# Patient Record
Sex: FEMALE | HISPANIC OR LATINO | Employment: FULL TIME | URBAN - METROPOLITAN AREA
[De-identification: names, ages, dates, MRNs, and addresses within clinical notes are randomized per-mention and may not be internally consistent; named-entity substitution may affect disease eponyms.]

---

## 2017-08-31 ENCOUNTER — HOSPITAL ENCOUNTER (OUTPATIENT)
Facility: MEDICAL CENTER | Age: 27
End: 2017-09-01
Attending: OBSTETRICS & GYNECOLOGY | Admitting: OBSTETRICS & GYNECOLOGY

## 2017-08-31 PROCEDURE — 86762 RUBELLA ANTIBODY: CPT

## 2017-08-31 PROCEDURE — 86803 HEPATITIS C AB TEST: CPT

## 2017-08-31 PROCEDURE — 86780 TREPONEMA PALLIDUM: CPT

## 2017-08-31 PROCEDURE — 85025 COMPLETE CBC W/AUTO DIFF WBC: CPT

## 2017-08-31 PROCEDURE — 87340 HEPATITIS B SURFACE AG IA: CPT

## 2017-08-31 PROCEDURE — 36415 COLL VENOUS BLD VENIPUNCTURE: CPT

## 2017-08-31 PROCEDURE — 59025 FETAL NON-STRESS TEST: CPT | Performed by: NURSE PRACTITIONER

## 2017-09-01 ENCOUNTER — APPOINTMENT (OUTPATIENT)
Dept: RADIOLOGY | Facility: MEDICAL CENTER | Age: 27
End: 2017-09-01
Attending: NURSE PRACTITIONER

## 2017-09-01 ENCOUNTER — HOSPITAL ENCOUNTER (OUTPATIENT)
Facility: MEDICAL CENTER | Age: 27
End: 2017-09-01
Attending: OBSTETRICS & GYNECOLOGY | Admitting: OBSTETRICS & GYNECOLOGY
Payer: MEDICAID

## 2017-09-01 VITALS — RESPIRATION RATE: 16 BRPM | TEMPERATURE: 97.5 F

## 2017-09-01 LAB
ABO GROUP BLD: NORMAL
AMPHET UR QL SCN: NEGATIVE
BARBITURATES UR QL SCN: NEGATIVE
BASOPHILS # BLD AUTO: 0.1 % (ref 0–1.8)
BASOPHILS # BLD: 0.01 K/UL (ref 0–0.12)
BENZODIAZ UR QL SCN: NEGATIVE
BLD GP AB SCN SERPL QL: NORMAL
BZE UR QL SCN: NEGATIVE
CANNABINOIDS UR QL SCN: NEGATIVE
EOSINOPHIL # BLD AUTO: 0.17 K/UL (ref 0–0.51)
EOSINOPHIL NFR BLD: 2.5 % (ref 0–6.9)
ERYTHROCYTE [DISTWIDTH] IN BLOOD BY AUTOMATED COUNT: 43.3 FL (ref 35.9–50)
HBV SURFACE AG SER QL: NEGATIVE
HCT VFR BLD AUTO: 30.6 % (ref 37–47)
HCV AB SER QL: NEGATIVE
HGB BLD-MCNC: 9.6 G/DL (ref 12–16)
HIV 1+2 AB+HIV1 P24 AG SERPL QL IA: NON REACTIVE
IMM GRANULOCYTES # BLD AUTO: 0.03 K/UL (ref 0–0.11)
IMM GRANULOCYTES NFR BLD AUTO: 0.4 % (ref 0–0.9)
LYMPHOCYTES # BLD AUTO: 2.43 K/UL (ref 1–4.8)
LYMPHOCYTES NFR BLD: 35.9 % (ref 22–41)
MCH RBC QN AUTO: 26.6 PG (ref 27–33)
MCHC RBC AUTO-ENTMCNC: 31.4 G/DL (ref 33.6–35)
MCV RBC AUTO: 84.8 FL (ref 81.4–97.8)
METHADONE UR QL SCN: NEGATIVE
MONOCYTES # BLD AUTO: 0.43 K/UL (ref 0–0.85)
MONOCYTES NFR BLD AUTO: 6.4 % (ref 0–13.4)
NEUTROPHILS # BLD AUTO: 3.69 K/UL (ref 2–7.15)
NEUTROPHILS NFR BLD: 54.7 % (ref 44–72)
NRBC # BLD AUTO: 0 K/UL
NRBC BLD AUTO-RTO: 0 /100 WBC
OPIATES UR QL SCN: NEGATIVE
OXYCODONE UR QL SCN: NEGATIVE
PCP UR QL SCN: NEGATIVE
PLATELET # BLD AUTO: 262 K/UL (ref 164–446)
PMV BLD AUTO: 10.1 FL (ref 9–12.9)
PROPOXYPH UR QL SCN: NEGATIVE
RBC # BLD AUTO: 3.61 M/UL (ref 4.2–5.4)
RH BLD: NORMAL
RUBV AB SER QL: 5.6 IU/ML
TREPONEMA PALLIDUM IGG+IGM AB [PRESENCE] IN SERUM OR PLASMA BY IMMUNOASSAY: NON REACTIVE
WBC # BLD AUTO: 6.8 K/UL (ref 4.8–10.8)

## 2017-09-01 PROCEDURE — 76805 OB US >/= 14 WKS SNGL FETUS: CPT

## 2017-09-01 PROCEDURE — 87389 HIV-1 AG W/HIV-1&-2 AB AG IA: CPT

## 2017-09-01 PROCEDURE — 86901 BLOOD TYPING SEROLOGIC RH(D): CPT

## 2017-09-01 PROCEDURE — 86850 RBC ANTIBODY SCREEN: CPT

## 2017-09-01 PROCEDURE — 86900 BLOOD TYPING SEROLOGIC ABO: CPT

## 2017-09-01 PROCEDURE — 81002 URINALYSIS NONAUTO W/O SCOPE: CPT

## 2017-09-01 PROCEDURE — 80307 DRUG TEST PRSMV CHEM ANLYZR: CPT

## 2017-09-01 NOTE — PROGRESS NOTES
LMP 17 EGA - 31.3     2245 - Pt arrived to labor and delivery for bleeding. Pt placed in room S223.  2250 - External monitors in place X2.VSS. Pt has not had any prenatal care, according to patient LMP 17 making her 31.3 weeks.  Pt reports bright red bleeding like a heavy period since 12pm today. No blood visualized by RN. Pt reports good FM. No complaints of contractions, ROM FOB at bedside. POC discussed with pt and family members, all questions answered.   2258- Laurel, CNM notified, report given, CNM to bedside.   2340- orders received for prenatal lab work and complete OB ultrasound.   2348- Laurel CNM at bedside to assess patient.   0030- US at bedside. Pt off monitors  0140- Laurel CNM updated, US at bedside.   0330- Laurel CNM updated with US results, baby is breech everything else WNL. Orders received to do a cervical check by RN.   0345- SVE FT/thick/posterior. Discharge instructions given with  labor precautions, pt instructed to call [facility] first thing in the morning to make prenatal appointment.   0347- Laurel CNM at bedside, education provided. Pt verbalizes understanding. All questions and concerns were addressed.

## 2017-09-01 NOTE — PROGRESS NOTES
Labor and Delivery Triage check    PATIENT ID:  NAME:  Allison Weston  MRN:               [id number]  YOB: 1990     27 y.o. female  at 106w1d.    Subjective: Pt presents to L&D for vaginal bleeding no pain, no prenatal care. States LMP 17 sure dates MARTHA 10/31/17 31 4/7 weeks today    Objective:    Vitals:    17 2300   Resp: 16   Temp: 36.4 °C (97.5 °F)       Cervix:  FTcm/0%/-3 posterior  Breech   U/S done here, all normal fetal survey   [place]: Uterine Contractions none.   FHRM: Baseline 130, mod variability, Accels +, no decels  medications: none  Pain: none    Assessment: 27 y.o. female    at 106w1d.  High Risk no Prenatal care  Plan:   1. Labor: not in labor  2. IUP: Category 1  tracing, breech presentation.  GBS not done yet  3. Prenatal labs done A+ blood type, no vaginal bleeding here tonight,   4. Urine dip is trace blood only  5. No previa or abruption noted on U/S   6. Pt to make apt at [facility] this week for New OB apt  7.  Drug screen urine  8. Discharge in stable condition,   Return to L&D for   Increased vaginal bleeding  Decreased FM  Strong regular contractions  Vaginal bleeding like a period  Leaking fluid from your vagina either a big gush or continuous leaking

## 2017-09-04 LAB
APPEARANCE UR: ABNORMAL
COLOR UR AUTO: YELLOW
GLUCOSE UR QL STRIP.AUTO: 100 MG/DL
KETONES UR QL STRIP.AUTO: ABNORMAL MG/DL
LEUKOCYTE ESTERASE UR QL STRIP.AUTO: NEGATIVE
NITRITE UR QL STRIP.AUTO: NEGATIVE
PH UR STRIP.AUTO: 7 [PH]
PROT UR QL STRIP: NEGATIVE MG/DL
RBC UR QL AUTO: ABNORMAL
SP GR UR: 1.02

## 2017-09-06 ENCOUNTER — APPOINTMENT (OUTPATIENT)
Dept: OBGYN | Facility: CLINIC | Age: 27
End: 2017-09-06
Payer: MEDICAID

## 2017-09-12 ENCOUNTER — HOSPITAL ENCOUNTER (OUTPATIENT)
Facility: MEDICAL CENTER | Age: 27
End: 2017-09-12
Attending: NURSE PRACTITIONER
Payer: COMMERCIAL

## 2017-09-12 ENCOUNTER — INITIAL PRENATAL (OUTPATIENT)
Dept: OBGYN | Facility: CLINIC | Age: 27
End: 2017-09-12

## 2017-09-12 VITALS
DIASTOLIC BLOOD PRESSURE: 60 MMHG | SYSTOLIC BLOOD PRESSURE: 110 MMHG | HEIGHT: 61 IN | BODY MASS INDEX: 34.36 KG/M2 | WEIGHT: 182 LBS

## 2017-09-12 DIAGNOSIS — O09.899 RUBELLA NON-IMMUNE STATUS, ANTEPARTUM: ICD-10-CM

## 2017-09-12 DIAGNOSIS — Z34.83 ENCOUNTER FOR SUPERVISION OF OTHER NORMAL PREGNANCY IN THIRD TRIMESTER: ICD-10-CM

## 2017-09-12 DIAGNOSIS — Z28.39 RUBELLA NON-IMMUNE STATUS, ANTEPARTUM: ICD-10-CM

## 2017-09-12 DIAGNOSIS — D50.9 IRON DEFICIENCY ANEMIA, UNSPECIFIED IRON DEFICIENCY ANEMIA TYPE: ICD-10-CM

## 2017-09-12 PROCEDURE — 90686 IIV4 VACC NO PRSV 0.5 ML IM: CPT | Performed by: NURSE PRACTITIONER

## 2017-09-12 PROCEDURE — 90471 IMMUNIZATION ADMIN: CPT | Performed by: NURSE PRACTITIONER

## 2017-09-12 PROCEDURE — 90472 IMMUNIZATION ADMIN EACH ADD: CPT | Performed by: NURSE PRACTITIONER

## 2017-09-12 PROCEDURE — 59402 PR NEW OB HIGH RISK: CPT | Performed by: NURSE PRACTITIONER

## 2017-09-12 PROCEDURE — 90715 TDAP VACCINE 7 YRS/> IM: CPT | Performed by: NURSE PRACTITIONER

## 2017-09-12 NOTE — LETTER
"Count Your Baby's Movements  Another step to a healthy delivery    Allison Weston    How Many Weeks Pregnant? 34w 3d    Date to Begin Countin17              How to use this chart    One way for your physician to keep track of your baby's health is by knowing how often the baby moves (or \"kicks\") in your womb.  You can help your physician to do this by using this chart every day.    Every day, you should see how many hours it takes for your baby to move 10 times.  Start in the morning, as soon as you get up.    · First, write down the time your baby moves until you get to 10.  · Check off one box every time your baby moves until you get to 10.  · Write down the time you finished counting in the last column.  · Total how long it took to count up all 10 movements.  · Finally, fill in the box that shows how long this took.  After counting 10 movements, you no longer have to count any more that day.  The next morning, just start counting again as soon as you get up.    What should you call a \"movement\"?  It is hard to say, because it will feel different from one mother to another and from one pregnancy to the next.  The important thing is that you count the movements the same way throughout your pregnancy.  If you have more questions, you should ask your physician.    Count carefully every day!  SAMPLE:  Week 28    How many hours did it take to feel 10 movements?       Start  Time     1     2     3     4     5     6     7     8     9     10   Finish Time   Mon 8:20 ·  ·  ·  ·  ·  ·  ·  ·  ·  ·  11:40                  Sat               Sun                 IMPORTANT: You should contact your physician if it takes more than two hours for you to feel 10 movements.  Each morning, write down the time and start to count the movements of your baby.  Keep track by checking off one box every time you feel one movement.  When you have felt 10 \"kicks\", write down the " time you finished counting in the last column.  Then fill in the   box (over the check madison) for the number of hours it took.  Be sure to read the complete instructions on the previous page.

## 2017-09-12 NOTE — PROGRESS NOTES
Tdap vaccine given. 09/12/20 Right Deltoid. VIS given and screening check list reviewed with pt/      Flu vaccine given. 09/12/2017 Left  Deltoid. VIS given and screening check list reviewed with pt.

## 2017-09-12 NOTE — LETTER
Cystic Fibrosis Carrier Testing  Allison Weston    The following information is about a blood test that can be done to determine if you and/or your partner carry the gene for cystic fibrosis.    WHAT IS CYSTIC FIBROSIS?  · Cystic fibrosis (CF) is an inherited disease that affects more than 25,000 American children and young adults.  · Symptoms of CF vary but include lung congestion, pneumonia, diarrhea and poor growth.  Most people with CF have severe medical problems and some die at a young age.  Others have so few symptoms they are unaware they have CF.  · CF does not affect intelligence.  · Although there is no cure for CF at this time, scientists are making progress in improving treatment and in searching for a cure.  In the past many people with CF  at a very young age.  Today, many are living into their 20’s and 30’s.    IS THERE A CHANCE MY BABY COULD HAVE CYSTIC FIBROSIS?  · You can have a child with CF even if there is no history in your family (see chart below).  · CF testing can help determine if you are a carrier and at risk to have a child with CF.  Note: if both parents are carriers, there is a 1 in 4 (25%) chance with each pregnancy that they will have a child with CF.  · Carriers have one normal CF gene and one altered CF gene.  · People with CF have two altered CF genes.  · Most people have two normal copies of the CF gene.    Approximate risk that a couple with no family history of cystic fibrosis will have a child with cystic fibrosis:    Ethnic background / Risk     couple:  1 in 2,500   couple:  1 in 15,000            couple:  1 in 8,000     American couple:  1 in 32,000     WHAT TESTING IS AVAILABLE?  · There is a blood test that can be done to find out if you or your partner is a carrier.  · It is important to understand that CF carrier testing does not detect all CF carriers.  · If the test shows that you are both CF carriers, you unborn baby  can be tested to find out if the baby has CF.    HOW MUCH DOES IT COST TO HAVE CYSTIC FIBROSIS CARRIER TESTING?  · Cost and insurance coverage for CF carrier testing vary depending upon the laboratory used and your insurance policy.  · The average cost for CF carrier testing is $780.00 per person.  · Your genetic counselor can provide you with more information about cystic fibrosis carrier testing.    _____  Yes, I am interested in discussing carrier testing with a genetic counselor.    _____  No, I am not interested in CF carrier testing or in receiving more information about CF carrier testing.      Client signature: ________________________________________  9/12/2017

## 2017-09-12 NOTE — PROGRESS NOTES
Pt here for New OB today  Phone: [phone]  Pharmacy verified  Pt c/o Kermit Mayers; denies any VB, LOF   Pt was seen at [facility] ER for VB on 8-31-17; VB has resolved  Desires BTL  Desires Flu vaccine and TDap  Declines CF

## 2017-09-12 NOTE — PROGRESS NOTES
S:  Allison Weston is a 27 y.o.  who presents for her new OB exam.  She is 34w3d with and MARTHA of Estimated Date of Delivery: 10/21/17 by ultrasound done on L&D for which she had presented for vaginal bleeding. She discharged home stable and the bleeding did not continue.She has no complaints.  Too late AFP.  declines CF.  Denies VB, LOF, or cramping.  Denies dysuria, vaginal DC. Reports good fetal movement.     Pt is  and lives with FOB.  Pregnancy is desired.      Past Medical History:   Diagnosis Date   • Anemia during pregnancy needs to add iron plus taking prenatal vit 2011     Family History   Problem Relation Age of Onset   • Diabetes Maternal Grandmother    • No Known Problems Mother    • Diabetes Father    • Diabetes Paternal Grandfather      Social History     Social History   • Marital status: Single     Spouse name: N/A   • Number of children: N/A   • Years of education: N/A     Occupational History   • Not on file.     Social History Main Topics   • Smoking status: Never Smoker   • Smokeless tobacco: Never Used   • Alcohol use No   • Drug use: No   • Sexual activity: Yes     Partners: Male      Comment: no birth control     Other Topics Concern   • Not on file     Social History Narrative   • No narrative on file     OB History    Para Term  AB Living   5 4 4     4   SAB TAB Ectopic Molar Multiple Live Births           0 4      # Outcome Date GA Lbr Bladimir/2nd Weight Sex Delivery Anes PTL Lv   5 Current            4 Term 16 40w2d  3.26 kg (7 lb 3 oz) F Vag-Spont  N TREVON      Birth Comments: No complications   3 Term 12 40w0d  3.402 kg (7 lb 8 oz) F Vag-Spont None N TREVON   2 Term 11 40w0d  3.26 kg (7 lb 3 oz) F Vag-Spont None  TREVON      Birth Comments: Denies complications   1 Term 09 40w0d 13:00 3.771 kg (8 lb 5 oz) F Vag-Spont None N TREVON      Birth Comments: no complications,  14mos.           History of Varicella Virus: no  History of  "HSV I or II in self or partner: no  History of Thyroid problems: none    O:    Vitals:    09/12/17 1426   BP: 110/60   Weight: 82.6 kg (182 lb)   Height: 1.549 m (5' 1\")      See Prenatal Physical.    Wet mount: none      A:   1.  IUP @ 34w3d per [facility] ultrasound        2.  S=D        3.  See problem list below        4.  Breech presention       Patient Active Problem List    Diagnosis Date Noted   • Rubella non-immune status 09/12/2017   • Breech presentation 09/12/2017   • Iron deficiency anemia 09/12/2017   • Encounter for supervision of other normal pregnancy 11/30/2015         P:  1.  GC/CT done        2.  Prenatal labs ordered - lab slip given        3.  Discussed PNV, diet, avoidances and adequate water intake        4.  NOB packet given        5.  Return to office in 2 wks        6.  Complete OB US done.         7.  Glucose slip and instructions today. TDAP and flu shot.         8.  Breech tilt exercises.     No orders of the defined types were placed in this encounter.    "

## 2017-09-13 LAB
C TRACH DNA SPEC QL NAA+PROBE: NEGATIVE
N GONORRHOEA DNA SPEC QL NAA+PROBE: NEGATIVE
SPECIMEN SOURCE: NORMAL

## 2017-09-29 ENCOUNTER — HOSPITAL ENCOUNTER (OUTPATIENT)
Dept: LAB | Facility: MEDICAL CENTER | Age: 27
End: 2017-09-29
Attending: NURSE PRACTITIONER
Payer: COMMERCIAL

## 2017-09-29 ENCOUNTER — HOSPITAL ENCOUNTER (OUTPATIENT)
Facility: MEDICAL CENTER | Age: 27
End: 2017-09-29
Attending: NURSE PRACTITIONER
Payer: COMMERCIAL

## 2017-09-29 ENCOUNTER — ROUTINE PRENATAL (OUTPATIENT)
Dept: OBGYN | Facility: CLINIC | Age: 27
End: 2017-09-29

## 2017-09-29 VITALS — WEIGHT: 184 LBS | DIASTOLIC BLOOD PRESSURE: 60 MMHG | BODY MASS INDEX: 34.77 KG/M2 | SYSTOLIC BLOOD PRESSURE: 108 MMHG

## 2017-09-29 DIAGNOSIS — Z34.83 ENCOUNTER FOR SUPERVISION OF OTHER NORMAL PREGNANCY IN THIRD TRIMESTER: ICD-10-CM

## 2017-09-29 DIAGNOSIS — Z34.80 ENCOUNTER FOR SUPERVISION OF OTHER NORMAL PREGNANCY: ICD-10-CM

## 2017-09-29 LAB — GLUCOSE 1H P 50 G GLC PO SERPL-MCNC: 97 MG/DL (ref 70–139)

## 2017-09-29 PROCEDURE — 90040 PR PRENATAL FOLLOW UP: CPT | Performed by: NURSE PRACTITIONER

## 2017-09-29 NOTE — PROGRESS NOTES
SUBJECTIVE:  Pt is a 27 y.o.   at 36w6d  gestation. Presents today for follow-up prenatal care. Reports no issues at this time.  Reports good  fetal movement. Denies cramping/contractions, bleeding or leaking of fluid. Denies dysuria, headaches, N/V, or other issues at this time. Generally feels well today. Pt is sure of her last LMP and they were regular every two months which was her normal.     OBJECTIVE:  - See prenatal vitals flow  Vitals:    17 0903   BP: 108/60   Weight: 83.5 kg (184 lb)   - Baby confirmed vertex by US    - Pertinent Labs: GC/CT neg, PNP WNL except anemia,              ASSESSMENT:   - IUP at 35w4d by LMP c/w 32 week US   - S=D   -   Patient Active Problem List    Diagnosis Date Noted   • Rubella non-immune status 2017   • Breech presentation 2017   • Iron deficiency anemia 2017   • Encounter for supervision of other normal pregnancy 2015         PLAN:  - PT to start iron TID  - Pt encouraged to do GCT  - GBS today collected  - S/sx pregnancy and labor warning signs vs general discomforts discussed  - Fetal movements and kick counts reviewed   - Adequate hydration reinforced  - Nutrition/exercise/vitamin education: continued PNV  - Plans for breastfeeding and formula   - Plans BTL for contraception PP: LARC reviewed  - S/p TDAP   - S/p Flu   - Anticipatory guidance given  - RTC in 1 week  for follow-up prenatal care

## 2017-09-29 NOTE — PROGRESS NOTES
Pt here today for OB follow up  Pt states having lower back pain.   Reports +FM  Good # [phone]  Pharmacy Confirmed.  GBS Today   Pt hasnt started taking iron.

## 2017-09-30 LAB — GP B STREP DNA SPEC QL NAA+PROBE: NEGATIVE

## 2017-10-10 ENCOUNTER — HOSPITAL ENCOUNTER (OUTPATIENT)
Facility: MEDICAL CENTER | Age: 27
End: 2017-10-10
Attending: OBSTETRICS & GYNECOLOGY | Admitting: OBSTETRICS & GYNECOLOGY

## 2017-10-10 PROCEDURE — 59025 FETAL NON-STRESS TEST: CPT | Performed by: OBSTETRICS & GYNECOLOGY

## 2017-10-11 NOTE — PROGRESS NOTES
Came in for ob check.EFM applied and POC discussed.she is due 10-30 which makes her 37.1weeks.she isn't ;leaking fluid or bleeding.SVE 2/50/-2 vertex.she is only mayte occasionally.observed-occasional contractions.baby is moving well.Dr Kim notified and patient discharged home in stable condition.

## 2017-10-25 ENCOUNTER — HOSPITAL ENCOUNTER (INPATIENT)
Facility: MEDICAL CENTER | Age: 27
LOS: 1 days | End: 2017-10-26
Attending: OBSTETRICS & GYNECOLOGY | Admitting: OBSTETRICS & GYNECOLOGY
Payer: MEDICAID

## 2017-10-25 LAB
BASOPHILS # BLD AUTO: 0.4 % (ref 0–1.8)
BASOPHILS # BLD: 0.03 K/UL (ref 0–0.12)
EOSINOPHIL # BLD AUTO: 0.1 K/UL (ref 0–0.51)
EOSINOPHIL NFR BLD: 1.4 % (ref 0–6.9)
ERYTHROCYTE [DISTWIDTH] IN BLOOD BY AUTOMATED COUNT: 48.9 FL (ref 35.9–50)
ERYTHROCYTE [DISTWIDTH] IN BLOOD BY AUTOMATED COUNT: 49.8 FL (ref 35.9–50)
HCT VFR BLD AUTO: 29.3 % (ref 37–47)
HCT VFR BLD AUTO: 31 % (ref 37–47)
HGB BLD-MCNC: 9.3 G/DL (ref 12–16)
HGB BLD-MCNC: 9.9 G/DL (ref 12–16)
HOLDING TUBE BB 8507: NORMAL
IMM GRANULOCYTES # BLD AUTO: 0.02 K/UL (ref 0–0.11)
IMM GRANULOCYTES NFR BLD AUTO: 0.3 % (ref 0–0.9)
LYMPHOCYTES # BLD AUTO: 1.72 K/UL (ref 1–4.8)
LYMPHOCYTES NFR BLD: 24.6 % (ref 22–41)
MCH RBC QN AUTO: 26.8 PG (ref 27–33)
MCH RBC QN AUTO: 27.1 PG (ref 27–33)
MCHC RBC AUTO-ENTMCNC: 31.7 G/DL (ref 33.6–35)
MCHC RBC AUTO-ENTMCNC: 31.9 G/DL (ref 33.6–35)
MCV RBC AUTO: 83.8 FL (ref 81.4–97.8)
MCV RBC AUTO: 85.4 FL (ref 81.4–97.8)
MONOCYTES # BLD AUTO: 0.45 K/UL (ref 0–0.85)
MONOCYTES NFR BLD AUTO: 6.4 % (ref 0–13.4)
NEUTROPHILS # BLD AUTO: 4.66 K/UL (ref 2–7.15)
NEUTROPHILS NFR BLD: 66.9 % (ref 44–72)
NRBC # BLD AUTO: 0 K/UL
NRBC BLD AUTO-RTO: 0 /100 WBC
PLATELET # BLD AUTO: 225 K/UL (ref 164–446)
PLATELET # BLD AUTO: 257 K/UL (ref 164–446)
PMV BLD AUTO: 10.7 FL (ref 9–12.9)
PMV BLD AUTO: 10.7 FL (ref 9–12.9)
RBC # BLD AUTO: 3.43 M/UL (ref 4.2–5.4)
RBC # BLD AUTO: 3.7 M/UL (ref 4.2–5.4)
WBC # BLD AUTO: 7 K/UL (ref 4.8–10.8)
WBC # BLD AUTO: 7.4 K/UL (ref 4.8–10.8)

## 2017-10-25 PROCEDURE — 770002 HCHG ROOM/CARE - OB PRIVATE (112)

## 2017-10-25 PROCEDURE — 700105 HCHG RX REV CODE 258

## 2017-10-25 PROCEDURE — 59409 OBSTETRICAL CARE: CPT

## 2017-10-25 PROCEDURE — 85025 COMPLETE CBC W/AUTO DIFF WBC: CPT

## 2017-10-25 PROCEDURE — A9270 NON-COVERED ITEM OR SERVICE: HCPCS | Performed by: NURSE PRACTITIONER

## 2017-10-25 PROCEDURE — 36415 COLL VENOUS BLD VENIPUNCTURE: CPT

## 2017-10-25 PROCEDURE — 85027 COMPLETE CBC AUTOMATED: CPT

## 2017-10-25 PROCEDURE — 304965 HCHG RECOVERY SERVICES

## 2017-10-25 PROCEDURE — 90471 IMMUNIZATION ADMIN: CPT

## 2017-10-25 PROCEDURE — 700111 HCHG RX REV CODE 636 W/ 250 OVERRIDE (IP)

## 2017-10-25 PROCEDURE — 10907ZC DRAINAGE OF AMNIOTIC FLUID, THERAPEUTIC FROM PRODUCTS OF CONCEPTION, VIA NATURAL OR ARTIFICIAL OPENING: ICD-10-PCS | Performed by: OBSTETRICS & GYNECOLOGY

## 2017-10-25 PROCEDURE — 700102 HCHG RX REV CODE 250 W/ 637 OVERRIDE(OP): Performed by: NURSE PRACTITIONER

## 2017-10-25 PROCEDURE — 3E0234Z INTRODUCTION OF SERUM, TOXOID AND VACCINE INTO MUSCLE, PERCUTANEOUS APPROACH: ICD-10-PCS | Performed by: OBSTETRICS & GYNECOLOGY

## 2017-10-25 PROCEDURE — 700111 HCHG RX REV CODE 636 W/ 250 OVERRIDE (IP): Performed by: NURSE PRACTITIONER

## 2017-10-25 PROCEDURE — 90707 MMR VACCINE SC: CPT

## 2017-10-25 RX ORDER — SODIUM CHLORIDE, SODIUM LACTATE, POTASSIUM CHLORIDE, CALCIUM CHLORIDE 600; 310; 30; 20 MG/100ML; MG/100ML; MG/100ML; MG/100ML
INJECTION, SOLUTION INTRAVENOUS PRN
Status: DISCONTINUED | OUTPATIENT
Start: 2017-10-25 | End: 2017-10-26 | Stop reason: HOSPADM

## 2017-10-25 RX ORDER — ONDANSETRON 2 MG/ML
4 INJECTION INTRAMUSCULAR; INTRAVENOUS EVERY 6 HOURS PRN
Status: DISCONTINUED | OUTPATIENT
Start: 2017-10-25 | End: 2017-10-26 | Stop reason: HOSPADM

## 2017-10-25 RX ORDER — ACETAMINOPHEN 325 MG/1
325 TABLET ORAL EVERY 4 HOURS PRN
Status: DISCONTINUED | OUTPATIENT
Start: 2017-10-25 | End: 2017-10-26 | Stop reason: HOSPADM

## 2017-10-25 RX ORDER — ALUMINA, MAGNESIA, AND SIMETHICONE 2400; 2400; 240 MG/30ML; MG/30ML; MG/30ML
30 SUSPENSION ORAL EVERY 6 HOURS PRN
Status: DISCONTINUED | OUTPATIENT
Start: 2017-10-25 | End: 2017-10-25 | Stop reason: HOSPADM

## 2017-10-25 RX ORDER — DOCUSATE SODIUM 100 MG/1
100 CAPSULE, LIQUID FILLED ORAL 2 TIMES DAILY PRN
Status: DISCONTINUED | OUTPATIENT
Start: 2017-10-25 | End: 2017-10-26 | Stop reason: HOSPADM

## 2017-10-25 RX ORDER — ONDANSETRON 4 MG/1
4 TABLET, ORALLY DISINTEGRATING ORAL EVERY 6 HOURS PRN
Status: DISCONTINUED | OUTPATIENT
Start: 2017-10-25 | End: 2017-10-26 | Stop reason: HOSPADM

## 2017-10-25 RX ORDER — BISACODYL 10 MG
10 SUPPOSITORY, RECTAL RECTAL PRN
Status: DISCONTINUED | OUTPATIENT
Start: 2017-10-25 | End: 2017-10-26 | Stop reason: HOSPADM

## 2017-10-25 RX ORDER — METHYLERGONOVINE MALEATE 0.2 MG/ML
0.2 INJECTION INTRAVENOUS
Status: DISCONTINUED | OUTPATIENT
Start: 2017-10-25 | End: 2017-10-25 | Stop reason: HOSPADM

## 2017-10-25 RX ORDER — SODIUM CHLORIDE, SODIUM LACTATE, POTASSIUM CHLORIDE, CALCIUM CHLORIDE 600; 310; 30; 20 MG/100ML; MG/100ML; MG/100ML; MG/100ML
INJECTION, SOLUTION INTRAVENOUS CONTINUOUS
Status: DISCONTINUED | OUTPATIENT
Start: 2017-10-25 | End: 2017-10-25 | Stop reason: HOSPADM

## 2017-10-25 RX ORDER — MISOPROSTOL 200 UG/1
800 TABLET ORAL
Status: COMPLETED | OUTPATIENT
Start: 2017-10-25 | End: 2017-10-25

## 2017-10-25 RX ORDER — IBUPROFEN 800 MG/1
800 TABLET ORAL EVERY 8 HOURS PRN
Status: DISCONTINUED | OUTPATIENT
Start: 2017-10-25 | End: 2017-10-26 | Stop reason: HOSPADM

## 2017-10-25 RX ORDER — OXYCODONE HYDROCHLORIDE AND ACETAMINOPHEN 5; 325 MG/1; MG/1
2 TABLET ORAL EVERY 4 HOURS PRN
Status: DISCONTINUED | OUTPATIENT
Start: 2017-10-25 | End: 2017-10-26 | Stop reason: HOSPADM

## 2017-10-25 RX ORDER — OXYCODONE HYDROCHLORIDE AND ACETAMINOPHEN 5; 325 MG/1; MG/1
1 TABLET ORAL EVERY 4 HOURS PRN
Status: DISCONTINUED | OUTPATIENT
Start: 2017-10-25 | End: 2017-10-26 | Stop reason: HOSPADM

## 2017-10-25 RX ORDER — DEXTROSE, SODIUM CHLORIDE, SODIUM LACTATE, POTASSIUM CHLORIDE, AND CALCIUM CHLORIDE 5; .6; .31; .03; .02 G/100ML; G/100ML; G/100ML; G/100ML; G/100ML
INJECTION, SOLUTION INTRAVENOUS CONTINUOUS
Status: DISCONTINUED | OUTPATIENT
Start: 2017-10-25 | End: 2017-10-25 | Stop reason: HOSPADM

## 2017-10-25 RX ORDER — SODIUM CHLORIDE, SODIUM LACTATE, POTASSIUM CHLORIDE, CALCIUM CHLORIDE 600; 310; 30; 20 MG/100ML; MG/100ML; MG/100ML; MG/100ML
INJECTION, SOLUTION INTRAVENOUS
Status: COMPLETED
Start: 2017-10-25 | End: 2017-10-25

## 2017-10-25 RX ADMIN — SODIUM CHLORIDE, SODIUM LACTATE, POTASSIUM CHLORIDE, CALCIUM CHLORIDE: 600; 310; 30; 20 INJECTION, SOLUTION INTRAVENOUS at 07:20

## 2017-10-25 RX ADMIN — OXYTOCIN 2000 ML/HR: 10 INJECTION, SOLUTION INTRAMUSCULAR; INTRAVENOUS at 10:00

## 2017-10-25 RX ADMIN — OXYCODONE HYDROCHLORIDE AND ACETAMINOPHEN 1 TABLET: 5; 325 TABLET ORAL at 10:25

## 2017-10-25 RX ADMIN — MEASLES, MUMPS, AND RUBELLA VIRUS VACCINE LIVE 0.5 ML: 1000; 12500; 1000 INJECTION, POWDER, LYOPHILIZED, FOR SUSPENSION SUBCUTANEOUS at 20:28

## 2017-10-25 RX ADMIN — IBUPROFEN 800 MG: 800 TABLET, FILM COATED ORAL at 10:25

## 2017-10-25 RX ADMIN — SODIUM CHLORIDE, POTASSIUM CHLORIDE, SODIUM LACTATE AND CALCIUM CHLORIDE: 600; 310; 30; 20 INJECTION, SOLUTION INTRAVENOUS at 07:20

## 2017-10-25 RX ADMIN — OXYTOCIN 125 ML/HR: 10 INJECTION, SOLUTION INTRAMUSCULAR; INTRAVENOUS at 11:00

## 2017-10-25 RX ADMIN — OXYCODONE HYDROCHLORIDE AND ACETAMINOPHEN 2 TABLET: 5; 325 TABLET ORAL at 16:16

## 2017-10-25 ASSESSMENT — LIFESTYLE VARIABLES
DO YOU DRINK ALCOHOL: NO
ALCOHOL_USE: NO
EVER_SMOKED: NEVER
DO YOU DRINK ALCOHOL: NO

## 2017-10-25 ASSESSMENT — COPD QUESTIONNAIRES
HAVE YOU SMOKED AT LEAST 100 CIGARETTES IN YOUR ENTIRE LIFE: NO/DON'T KNOW
DO YOU EVER COUGH UP ANY MUCUS OR PHLEGM?: NO/ONLY WITH OCCASIONAL COLDS OR INFECTIONS
DURING THE PAST 4 WEEKS HOW MUCH DID YOU FEEL SHORT OF BREATH: NONE/LITTLE OF THE TIME

## 2017-10-25 ASSESSMENT — PAIN SCALES - GENERAL
PAINLEVEL_OUTOF10: 0
PAINLEVEL_OUTOF10: 8
PAINLEVEL_OUTOF10: 10
PAINLEVEL_OUTOF10: 0
PAINLEVEL_OUTOF10: 0
PAINLEVEL_OUTOF10: ASSUMED PAIN PRESENT

## 2017-10-25 ASSESSMENT — PATIENT HEALTH QUESTIONNAIRE - PHQ9
SUM OF ALL RESPONSES TO PHQ QUESTIONS 1-9: 0
1. LITTLE INTEREST OR PLEASURE IN DOING THINGS: NOT AT ALL
SUM OF ALL RESPONSES TO PHQ9 QUESTIONS 1 AND 2: 0
2. FEELING DOWN, DEPRESSED, IRRITABLE, OR HOPELESS: NOT AT ALL

## 2017-10-25 NOTE — PROGRESS NOTES
In to see patient, feeling some pressure with UC's.  SVE done, /-2, AROM performed with clear fluid noted  FHT's reactive, baseline 125bpm, mod variability, +accels, -decels  TOCO every 5-7 minutes.   Room set up for delivery  Anticipate     Neela Schultz CNM

## 2017-10-25 NOTE — CONSULTS
Lactation visit with mother. Baby asleep in [facility]. Discussed doing skin to skin care when pt is awake, feed on hunger cue,without time limits, avoid pacifiers and bottles for 4 weeks to ensure full milk supply. Pt states last breastfeed was comfortable. Will call for latch assistance as needed.

## 2017-10-25 NOTE — CARE PLAN
Problem: Pain  Goal: Alleviation of Pain or a reduction in pain to the patient's comfort goal  Outcome: PROGRESSING AS EXPECTED  Pt reports pain at 10/10 with UCs. Pt appears calm and tolerating pain well. Declines medicinal interventions.    Problem: Risk for Infection, Impaired Wound Healing  Goal: Remain free from signs and symptoms of infection  Outcome: PROGRESSING AS EXPECTED  Pt afebrile & GBS negative.

## 2017-10-25 NOTE — DELIVERY NOTE
DELIVERY NOTE - Normal Spontaneous Vaginal Delivery    Viable male infant delivered over intact perineum with apgars of 8 and 9, weight of 7 lbs 8.5 oz grams with no anesthesia. Infant placed on maternal abdomen.  Delayed cord clamping performed when cord stopped pulsating.  Cord cut by father of baby. Placenta delivered spontaneously, was 3 vessel and intact.  Cervix and perineum inspected.  First degree right labial laceration noted to be hemostatic, not repaired.   Hemostasis obtained.   cc.   20 units of Pitocin in  1000ml LR administered IV after delivery.  There were no complications.  Mother and infant in stable condition in room.

## 2017-10-25 NOTE — PROGRESS NOTES
1225 Receive patient from labor and delivery. Oriented to room and emergency light system, education for mom and baby thru lauren light. Instructed to increase fluid intake and to watch for increase bleeding. initial assessment completed.

## 2017-10-25 NOTE — H&P
History and Physical    Allison Weston is a 27 y.o. female  -Para:     Gestational Age:  39w2d  Admitted for:   Active Labor  Admitted to  [facility] Labor [facility] Delivery.  [facility], very limited care, 2 visits    HPI: Patient is admitted with the above mentioned Chief Complaint and States   Loss of fluid:   negative  Abdominal Pain:  negative  Uterine Contractions:  positive  Vaginal Bleeding:  negative  Fetal Movement:  normal  Patient denies fever, chills, nausea, vomiting , headache, visual disturbance, or dysuria  Patient's last menstrual period was 2017 (exact date).  Estimated Date of Delivery: 10/30/17  Final MARTHA: 10/30/2017, by Last Menstrual Period    Patient Active Problem List    Diagnosis Date Noted   • Rubella non-immune status 2017   • Breech presentation 2017   • Iron deficiency anemia 2017   • Encounter for supervision of other normal pregnancy 2015       Admitting DX: pregnancy  Pregnancy Complications:  Limited care  OB Risk Factors:   non-compliance  Labor State:    Active phase labor.    History:   has a past medical history of Anemia during pregnancy needs to add iron plus taking prenatal vit (2011). She also has no past medical history of Anxiety; ASTHMA; Blood transfusion without reported diagnosis; Depression; Diabetes; Headache; Hypertension; IBD (inflammatory bowel disease); Kidney disease; Seizure (CMS-HCC); Substance abuse; Thyroid disease; or Urinary tract infection, site not specified.     has a past surgical history that includes umbilical hernia repair (2015).    OB History    Para Term  AB Living   5 4 4     4   SAB TAB Ectopic Molar Multiple Live Births           0 4      # Outcome Date GA Lbr Bladimir/2nd Weight Sex Delivery Anes PTL Lv   5 Current            4 Term 16 40w2d  3.26 kg (7 lb 3 oz) F Vag-Spont  N TREVON      Birth Comments: No complications   3 Term 12  "40w0d  3.402 kg (7 lb 8 oz) F Vag-Spont None N TREVON   2 Term 07/18/11 40w0d  3.26 kg (7 lb 3 oz) F Vag-Spont None  TREVON      Birth Comments: Denies complications   1 Term 06/04/09 40w0d 13:00 3.771 kg (8 lb 5 oz) F Vag-Spont None N TREVON      Birth Comments: no complications,  14mos.           Medications:  No current facility-administered medications on file prior to encounter.      Current Outpatient Prescriptions on File Prior to Encounter   Medication Sig Dispense Refill   • ibuprofen (MOTRIN) 800 MG Tab Take 1 Tab by mouth every 8 hours as needed (Cramping). 30 Tab 3   • ferrous sulfate 325 (65 FE) MG tablet Take 1 Tab by mouth every day. 30 Tab 3   • Prenat w/o A-FE-DSS-Methfol-FA (PRENATAL MULTIVITAMIN) -400 MG-MCG-MCG tablet Take 1 Tab by mouth every day. 30 Each 3       Allergies:  Review of patient's allergies indicates no known allergies.    ROS:   Neuro: negative    Cardiovascular: negative  Gastro intestinal: negative  Genitourinary: negative            Physical Exam:  Ht 1.549 m (5' 1\")   Wt 86.2 kg (190 lb)   LMP 01/23/2017 (Exact Date)   BMI 35.90 kg/m²   Constitutional: healthy-appearing, Well-developed, well-nourished, in no acute distress  No JVD: while supine  HEENT: EOMI and Neck supple with midline trachea  Breast Exam: negative  Cardio: regular rate and rhythm  Lung: unlabored respirations, no intercostal retractions or accessory muscle use, clear to auscultation without rales or wheezes  Abdomen: abdomen is soft without significant tenderness, masses, organomegaly or guarding  Extremity: extremities, peripheral pulses and reflexes normal, no edema, redness or tenderness in the calves or thighs, feet normal, good pulses, normal color, temperature and sensation    Cervical Exam: 60%  Cervix Dilatation: 7  Station: negative 2  Pelvis: Adequate  Fetal Assessment: Fetal heart variability: moderate  Fetal Heart Rate decelerations: none  Fetal Heart Rate accelerations: yes  Baseline " FHR: 125 per minute  Uterine contractions: regular, every 3-7 minutes  Estimated Fetal Weight: 3500 - 4000g      Labs:      Prenatal Results     1st Trimester     Test Value Date Time    ABO A  09/01/17 0016    RH POS  09/01/17 0016    Antibody NEG  09/01/17 0016    CBC/PLT/DIFF       HGB 9.6 g/dL (L) 09/01/17 0022    Platelets 262 K/uL 09/01/17 0022    HGB A1C        1 Hr GCT 97 mg/dL 09/29/17 1050    3 Hr GTT       Rubella 5.60 IU/mL 09/01/17 0022    RPR nonreact  03/31/16     Urine Culture proteus mirabilis  03/31/16     24 Urine Protein        24 Urine Creatinine        HBsAg Negative  09/01/17 0022    Hep CAB  Negative  09/01/17 0022    HIV non react  03/31/16     Gonorrhea Negative  02/15/11 1007    Chlamydia Negative  02/15/11 1007    TSH        Free T4         TB       Pap       SYPHILUS TREP QUAL [id number] [[id number]][             2nd Trimester     Test Value Date Time    HCT 30.6 % (L) 09/01/17 0022    HGB 9.6 g/dL (L) 09/01/17 0022    1 Hr GCT 97 mg/dL 09/29/17 1050    3 Hr GTT             24-28 Weeks     Test Value Date Time    1 Hr GCT  97 mg/dL 09/29/17 1050    TSH        Free T4        24 Urine Protein       24 Urine Creatinine       BUN       Creatinine       GFR       AST       ALT       Uric Acid       LDH             3rd Trimester     Test Value Date Time    HCT 30.6 % (L) 09/01/17 0022    HGB 9.6 g/dL (L) 09/01/17 0022    TSH       Free T4       24 Hr Urine Protein       24 Hr Urine Creatinine       SYPHILUS TREP QUAL Non Reactive  09/01/17 0022          35-37 Weeks     Test Value Date Time    GBS PCR LB Negative  09/29/17 0937    GBS PCR Negative  09/29/17 0937          Genetic Screening     Test Value Date Time    Cystic Fibrosis       AFP Quad Normal  02/15/11 1017    Sickle Cell                     Assessment:  Gestational Age:  39w2d  Labor State:   Labor, Active  Risk Factors:   non-compliance  Pregnancy Complications: Limited care    Patient Active Problem List    Diagnosis Date Noted   • Rubella  non-immune status 2017   • Breech presentation 2017   • Iron deficiency anemia 2017   • Encounter for supervision of other normal pregnancy 2015       Plan:   Admitted for: Active Labor    CBC  routine urinalysis  Antibiotics: Not indicated at this time    GBS negative  Desires unmedicated labor  Anticipate     Neela Schultz C.N.M.    Dr Wilson is the attending MD today

## 2017-10-25 NOTE — PROGRESS NOTES
, due 10/30, 39.2 weeks  0700: Pt presents to L&D with c/o painful UCs since 2300 last night. Denies LOF, VB, +FM. EFM/[place] placed. VSS. SVE by RN, /-2. HAMILTON Schultz CNM notified of pt. Orders received for admission.  0725: HAMILTON Schultz CNM at bedside. U/S confirmed vertex position.  0748: HAMILTON Schultz CNM at bedside. AROM, clear. SVE /-2.  0904: Pt called out reporting pressure and urge to push. SVE by RN, anterior lip/0. Dr. Robb updated.  0915: SVE C/0, pt not feeling urge to push. RN at bedside providing labor support.  0954: Dr. Robb at bedside for delivery.  0957:  of viable male infant, 8/9 APGARs, 3VC.  1001: Placenta delivered S/I, right labial 1st degree laceration, no repair necessary.   1205: Pt and infant transferred to  332 via wheelchair. Report given to MANJU Woodward. POC discussed.

## 2017-10-26 VITALS
WEIGHT: 190 LBS | TEMPERATURE: 98.1 F | BODY MASS INDEX: 35.87 KG/M2 | OXYGEN SATURATION: 97 % | HEART RATE: 77 BPM | RESPIRATION RATE: 18 BRPM | SYSTOLIC BLOOD PRESSURE: 107 MMHG | HEIGHT: 61 IN | DIASTOLIC BLOOD PRESSURE: 68 MMHG

## 2017-10-26 PROCEDURE — 700102 HCHG RX REV CODE 250 W/ 637 OVERRIDE(OP): Performed by: NURSE PRACTITIONER

## 2017-10-26 PROCEDURE — A9270 NON-COVERED ITEM OR SERVICE: HCPCS | Performed by: NURSE PRACTITIONER

## 2017-10-26 RX ORDER — IBUPROFEN 800 MG/1
800 TABLET ORAL EVERY 8 HOURS PRN
Qty: 30 TAB | Refills: 1 | Status: SHIPPED | OUTPATIENT
Start: 2017-10-26

## 2017-10-26 RX ORDER — PSEUDOEPHEDRINE HCL 30 MG
100 TABLET ORAL 2 TIMES DAILY PRN
Qty: 60 CAP | Refills: 1 | Status: SHIPPED | OUTPATIENT
Start: 2017-10-26

## 2017-10-26 RX ORDER — FERROUS SULFATE 325(65) MG
325 TABLET ORAL 3 TIMES DAILY
Qty: 90 TAB | Refills: 2 | Status: SHIPPED | OUTPATIENT
Start: 2017-10-26

## 2017-10-26 RX ADMIN — OXYCODONE HYDROCHLORIDE AND ACETAMINOPHEN 1 TABLET: 5; 325 TABLET ORAL at 12:36

## 2017-10-26 RX ADMIN — IBUPROFEN 800 MG: 800 TABLET, FILM COATED ORAL at 12:35

## 2017-10-26 ASSESSMENT — PAIN SCALES - GENERAL: PAINLEVEL_OUTOF10: 6

## 2017-10-26 NOTE — PROGRESS NOTES
Bands verified with [facility]  Pt discharged in stable condition. Infant in carseat, family escorted out by volunteer

## 2017-10-26 NOTE — PROGRESS NOTES
Mother reports breastfeeding going well, breast fed previous babies (4) for 1 year, c/o only of sore nipples. Nipples assessed, no break down noted, discussed using lanolin or hand expressing colostrum/milk and rub on nipples then air dry to encourage healing. Breastfeeding plan, breastfeed every 2-3 hours on demand, when baby showing hunger cues. Mother reports has WIC and plans to follow up with them after discharge.

## 2017-10-26 NOTE — DISCHARGE SUMMARY
Discharge Summary:      Allison Weston      Admit Date:   10/25/2017  Discharge Date:  10/26/2017     Admitting diagnosis:  pregnancy  Indication for care in labor or delivery  Discharge Diagnosis: Status post vaginal, spontaneous.  Pregnancy Complications: late prenatal care  Tubal Ligation:  no        History:  Past Medical History:   Diagnosis Date   • Anemia during pregnancy needs to add iron plus taking prenatal vit 2011     OB History    Para Term  AB Living   5 4 4     4   SAB TAB Ectopic Molar Multiple Live Births           0 4      # Outcome Date GA Lbr Bladimir/2nd Weight Sex Delivery Anes PTL Lv   5 Current            4 Term 16 40w2d  3.26 kg (7 lb 3 oz) F Vag-Spont  N TREVON      Birth Comments: No complications   3 Term 12 40w0d  3.402 kg (7 lb 8 oz) F Vag-Spont None N TREVON   2 Term 11 40w0d  3.26 kg (7 lb 3 oz) F Vag-Spont None  TREVON      Birth Comments: Denies complications   1 Term 09 40w0d 13:00 3.771 kg (8 lb 5 oz) F Vag-Spont None N TREVON      Birth Comments: no complications,  14mos.            Review of patient's allergies indicates no known allergies.  Patient Active Problem List    Diagnosis Date Noted   • Rubella non-immune status 2017   • Iron deficiency anemia 2017        Hospital Course:   27 y.o. , now para 5, was admitted with the above mentioned diagnosis, underwent Active Labor, vaginal, spontaneous. Patient postpartum course was unremarkable, with progressive advancement in diet , ambulation and toleration of oral analgesia. Patient without complaints today and desires discharge.      Vitals:    10/25/17 0703 10/25/17 1212 10/25/17 1600 10/25/17 2000   BP: 118/68 101/62 105/63 (!) 98/65   Pulse: 78 69 76 79   Resp:  18 18 16   Temp: 36.1 °C (97 °F) 36.4 °C (97.5 °F) 36.4 °C (97.6 °F) 36.1 °C (96.9 °F)   SpO2:  98% 99% 95%   Weight:       Height:           Current Facility-Administered Medications   Medication Dose    • ondansetron (ZOFRAN ODT) dispertab 4 mg  4 mg    Or   • ondansetron (ZOFRAN) syringe/vial injection 4 mg  4 mg   • oxytocin (PITOCIN) infusion (for postpartum)   mL/hr   • ibuprofen (MOTRIN) tablet 800 mg  800 mg   • acetaminophen (TYLENOL) tablet 325 mg  325 mg   • oxycodone-acetaminophen (PERCOCET) 5-325 MG per tablet 1 Tab  1 Tab   • oxycodone-acetaminophen (PERCOCET) 5-325 MG per tablet 2 Tab  2 Tab   • LR infusion     • PRN oxytocin (PITOCIN) (20 Units/1000 mL) PRN for excessive uterine bleeding - See Admin Instr  125-999 mL/hr   • docusate sodium (COLACE) capsule 100 mg  100 mg   • bisacodyl (DULCOLAX) suppository 10 mg  10 mg   • magnesium hydroxide (MILK OF MAGNESIA) suspension 30 mL  30 mL       Exam:  Breast Exam: Inspection negative. No nipple discharge or bleeding. No masses or nodularity palpable  Abdomen: Abdomen soft, non-tender. BS normal. No masses,  No organomegaly  Fundus Non Tender: no  Incision: none  Perineum: perineum intact  Extremity: extremities, peripheral pulses and reflexes normal, no edema, redness or tenderness in the calves or thighs   No edema DTR's 2+  Labs:  Recent Labs      10/25/17   0720  10/25/17   1947   WBC  7.0  7.4   RBC  3.70*  3.43*   HEMOGLOBIN  9.9*  9.3*   HEMATOCRIT  31.0*  29.3*   MCV  83.8  85.4   MCH  26.8*  27.1   MCHC  31.9*  31.7*   RDW  48.9  49.8   PLATELETCT  257  225   MPV  10.7  10.7        Activity:   Discharge to home  Pelvic Rest x 6 weeks    Assessment:  normal postpartum course  Discharge Assessment: No heavy bleeding or foul vaginal discharge    Rubella non Immune   Anemia     Follow up: .[facility]  in 5 weeks for vaginal delivery      Discharge Meds:   Current Outpatient Prescriptions   Medication Sig Dispense Refill   • ibuprofen (MOTRIN) 800 MG Tab Take 1 Tab by mouth every 8 hours as needed (For cramping after delivery; do not give if patient is receiving ketorolac (Toradol)). 30 Tab 1   • ferrous sulfate 325 (65 Fe) MG tablet Take 1 Tab by  mouth 3 times a day. 90 Tab 2   • docusate sodium 100 MG Cap Take 100 mg by mouth 2 times a day as needed for Constipation. 60 Cap 1       FAHAD Engel.

## 2017-10-26 NOTE — DISCHARGE INSTRUCTIONS
POSTPARTUM DISCHARGE INSTRUCTIONS FOR MOM    YOB: 1990   Age: 27 y.o.               Admit Date: 10/25/2017     Discharge Date: 10/26/2017  Attending Doctor:  Rakesh Wilson M.D.                  Allergies:  Review of patient's allergies indicates no known allergies.    Discharged to home by car. Discharged via wheelchair, hospital escort: Yes.  Special equipment needed: Not Applicable  Belongings with: Personal  Be sure to schedule a follow-up appointment with your primary care doctor or any specialists as instructed.     Discharge Plan:   Diet Plan: Discussed  Activity Level: Discussed  Confirmed Follow up Appointment: Patient to Call and Schedule Appointment  Confirmed Symptoms Management: Discussed  Medication Reconciliation Updated: Yes  Influenza Vaccine Indication: Not indicated: Previously immunized this influenza season and > 8 years of age    REASONS TO CALL YOUR OBSTETRICIAN:  1.   Persistent fever or shaking chills (Temperature higher than 100.4)  2.   Heavy bleeding (soaking more than 1 pad per hour); Passing clots  3.   Foul odor from vagina  4.   Mastitis (Breast infection; breast pain, chills, fever, redness)  5.   Urinary pain, burning or frequency  6.   Episiotomy infection    8.   Severe depression longer than 24 hours    HAND WASHING  · Prior to handling the baby.  · Before breastfeeding or bottle feeding baby.  · After using the bathroom or changing the baby's diaper.        VAGINAL CARE  · Nothing inside vagina for 6 weeks: no sexual intercourse, tampons or douching.  · Bleeding may continue for 2-4 weeks.  Amount may vary.    · Call your physician for heavy bleeding which means soaking more than 1 pad per hour    BIRTH CONTROL  · It is possible to become pregnant at any time after delivery and while breastfeeding.  · Plan to discuss a method of birth control with your physician at your follow up visit. visit.    DIET AND ELIMINATION  · Eating more fiber (bran cereal, fruits, and  "vegetables) and drinking plenty of fluids will help to avoid constipation.  · Urinary frequency after childbirth is normal.    POSTPARTUM BLUES  During the first few days after birth, you may experience a sense of the \"blues\" which may include impatience, irritability or even crying.  These feeling come and go quickly.  However, as many as 1 in 10 women experience emotional symptoms known as postpartum depression.    Postpartum depression:  May start as early as the second or third day after delivery or take several weeks or months to develop.  Symptoms of \"blues\" are present, but are more intense:  Crying spells; loss of appetite; feelings of hopelessness or loss of control; fear of touching the baby; over concern or no concern at all about the baby; little or no concern about your own appearance/caring for yourself; and/or inability to sleep or excessive sleeping.  Contact your physician if you are experiencing any of these symptoms.    Crisis Hotline:  · [place] Crisis Hotline:  0-331-UYKLKUY  Or 1-822.723.8886  · [place] Crisis Hotline:  1-172.698.5685  Or 462-772-3176    PREVENTING SHAKEN BABY:  If you are angry or stressed, PUT THE BABY IN THE CRIB, step away, take some deep breaths, and wait until you are calm to care for the baby.  DO NOT SHAKE THE BABY.  You are not alone, call a supporter for help.    · Crisis Call Center 24/7 crisis line 509-536-7698 or 1-731.809.4089  · You can also text them, text \"ANSWER\" to 909266    QUIT SMOKING/TOBACCO USE:  I understand the use of any tobacco products increases my chance of suffering from future heart disease and could cause other illnesses which may shorten my life. Quitting the use of tobacco products is the single most important thing I can do to improve my health. For further information on smoking / tobacco cessation call a Toll Free Quit Line at 1-170.620.1352 (*National Cancer [place]) or 1-557.438.4122 (American Lung Association) or you can access the web " based program at www.lungusa.org.    · [place] Tobacco Users Help Line:  (695) 901-3528       Toll Free: 1-639.727.8853  · Quit Tobacco Program [facility] (665)038-1255    DEPRESSION / SUICIDE RISK:  As you are discharged from this [facility], it is important to learn how to keep safe from harming yourself.    Recognize the warning signs:  · Abrupt changes in personality, positive or negative- including increase in energy   · Giving away possessions  · Change in eating patterns- significant weight changes-  positive or negative  · Change in sleeping patterns- unable to sleep or sleeping all the time   · Unwillingness or inability to communicate  · Depression  · Unusual sadness, discouragement and loneliness  · Talk of wanting to die  · Neglect of personal appearance   · Rebelliousness- reckless behavior  · Withdrawal from people/activities they love  · Confusion- inability to concentrate     If you or a loved one observes any of these behaviors or has concerns about self-harm, here's what you can do:  · Talk about it- your feelings and reasons for harming yourself  · Remove any means that you might use to hurt yourself (examples: pills, rope, extension cords, firearm)  · Get professional help from the community (Mental Health, Substance Abuse, psychological counseling)  · Do not be alone:Call your Safe Contact- someone whom you trust who will be there for you.  · Call your local CRISIS HOTLINE 887-7776 or 983-843-0636  · Call your local Children's Mobile Crisis Response Team [place] (600) 308-0656 or www.[place]  · Call the toll free National Suicide Prevention Hotlines   · National Suicide Prevention Lifeline 517-448-TMOF (7295)  · National Hope Line Network 800-SUICIDE (869-9079)    DISCHARGE SURVEY:  Thank you for choosing [facility].  We hope we provided you with very good care.  You may be receiving a survey in the mail.  Please fill it out.  Your opinion is  valuable to us.    ADDITIONAL EDUCATIONAL MATERIALS GIVEN TO PATIENT:        My signature on this form indicates that:  1.  I have reviewed and understand the above information  2.  My questions regarding this information have been answered to my satisfaction.  3.  I have formulated a plan with my discharge nurse to obtain my prescribed medication for home.

## 2017-10-26 NOTE — PROGRESS NOTES
Assumed care of patient and assessment complete. Patient in stable condition, vitals WDL, fundus firm, lochia light. Discussed plan of care for the night. Call light in reach, bed in lowest position, encouraged to call if assistance is needed.

## 2017-10-26 NOTE — DISCHARGE PLANNING
:     Referral: 2 prenatal visits     Intervention:  Received a referral to see ROBERTA who delivered her fifth child and only had 2 prenatal visits.  Met with MOB, Allison Weston and verified her address and phone number.  Address is [address] [place] [address].  Phone number is [phone].  The FOB is Haja Sanchez and he is involved.  She is naming this baby Toney.  ROBERTA has four daughters: Ting Meraz (5/28/16), Sona Weston (9/20/12), Renita Weston (7/18/11), and Lola Weston (6/4/09).  Mother stated she is prepared for the baby and is receiving Medicaid and WIC.  Her pediatrician is Dr. Bull at [facility].  Provided ROBERTA with a children's resource list and a diaper bank referral.  MOB stated she only had two prenatal visits because she was so busy with the other children at home it was hard to have time to go to the doctor.  MOB denies any drug or alcohol use and infant's tox screen was negative and MOB's was negative on 9/1.  MOB denies any domestic violence but mentioned she does have a CPS worker-Erna Kaur because her oldest daughter has asthma and she has missed a lot of school and so CPS was called on her.  SW contacted Erna Kaur who stated she does not have any concerns with this baby discharging home with parents.      Plan:  Infant is cleared to discharge home with ROBERTA.

## 2018-03-17 ENCOUNTER — HOSPITAL ENCOUNTER (EMERGENCY)
Facility: MEDICAL CENTER | Age: 28
End: 2018-03-18
Attending: EMERGENCY MEDICINE

## 2018-03-17 DIAGNOSIS — R42 VERTIGO: ICD-10-CM

## 2018-03-17 LAB — EKG IMPRESSION: NORMAL

## 2018-03-17 PROCEDURE — 93005 ELECTROCARDIOGRAM TRACING: CPT | Performed by: EMERGENCY MEDICINE

## 2018-03-17 PROCEDURE — 93005 ELECTROCARDIOGRAM TRACING: CPT

## 2018-03-17 PROCEDURE — 99284 EMERGENCY DEPT VISIT MOD MDM: CPT

## 2018-03-17 ASSESSMENT — PAIN SCALES - GENERAL: PAINLEVEL_OUTOF10: 0

## 2018-03-18 VITALS
OXYGEN SATURATION: 97 % | HEART RATE: 68 BPM | TEMPERATURE: 98.9 F | BODY MASS INDEX: 33.01 KG/M2 | RESPIRATION RATE: 18 BRPM | HEIGHT: 61 IN | WEIGHT: 174.82 LBS | DIASTOLIC BLOOD PRESSURE: 77 MMHG | SYSTOLIC BLOOD PRESSURE: 130 MMHG

## 2018-03-18 LAB
ALBUMIN SERPL BCP-MCNC: 4.4 G/DL (ref 3.2–4.9)
ALBUMIN/GLOB SERPL: 1.1 G/DL
ALP SERPL-CCNC: 79 U/L (ref 30–99)
ALT SERPL-CCNC: 257 U/L (ref 2–50)
ANION GAP SERPL CALC-SCNC: 9 MMOL/L (ref 0–11.9)
AST SERPL-CCNC: 114 U/L (ref 12–45)
BASOPHILS # BLD AUTO: 0.4 % (ref 0–1.8)
BASOPHILS # BLD: 0.03 K/UL (ref 0–0.12)
BILIRUB SERPL-MCNC: 0.5 MG/DL (ref 0.1–1.5)
BUN SERPL-MCNC: 9 MG/DL (ref 8–22)
CALCIUM SERPL-MCNC: 10.2 MG/DL (ref 8.5–10.5)
CHLORIDE SERPL-SCNC: 105 MMOL/L (ref 96–112)
CO2 SERPL-SCNC: 24 MMOL/L (ref 20–33)
CREAT SERPL-MCNC: 0.44 MG/DL (ref 0.5–1.4)
EOSINOPHIL # BLD AUTO: 0.32 K/UL (ref 0–0.51)
EOSINOPHIL NFR BLD: 4.6 % (ref 0–6.9)
ERYTHROCYTE [DISTWIDTH] IN BLOOD BY AUTOMATED COUNT: 42.5 FL (ref 35.9–50)
GLOBULIN SER CALC-MCNC: 4 G/DL (ref 1.9–3.5)
GLUCOSE SERPL-MCNC: 136 MG/DL (ref 65–99)
HCG SERPL QL: NEGATIVE
HCT VFR BLD AUTO: 38.2 % (ref 37–47)
HGB BLD-MCNC: 13 G/DL (ref 12–16)
IMM GRANULOCYTES # BLD AUTO: 0.02 K/UL (ref 0–0.11)
IMM GRANULOCYTES NFR BLD AUTO: 0.3 % (ref 0–0.9)
LYMPHOCYTES # BLD AUTO: 2.37 K/UL (ref 1–4.8)
LYMPHOCYTES NFR BLD: 34.3 % (ref 22–41)
MCH RBC QN AUTO: 30.2 PG (ref 27–33)
MCHC RBC AUTO-ENTMCNC: 34 G/DL (ref 33.6–35)
MCV RBC AUTO: 88.6 FL (ref 81.4–97.8)
MONOCYTES # BLD AUTO: 0.32 K/UL (ref 0–0.85)
MONOCYTES NFR BLD AUTO: 4.6 % (ref 0–13.4)
NEUTROPHILS # BLD AUTO: 3.85 K/UL (ref 2–7.15)
NEUTROPHILS NFR BLD: 55.8 % (ref 44–72)
NRBC # BLD AUTO: 0 K/UL
NRBC BLD-RTO: 0 /100 WBC
PLATELET # BLD AUTO: 280 K/UL (ref 164–446)
PMV BLD AUTO: 10.2 FL (ref 9–12.9)
POTASSIUM SERPL-SCNC: 3.7 MMOL/L (ref 3.6–5.5)
PROT SERPL-MCNC: 8.4 G/DL (ref 6–8.2)
RBC # BLD AUTO: 4.31 M/UL (ref 4.2–5.4)
SODIUM SERPL-SCNC: 138 MMOL/L (ref 135–145)
WBC # BLD AUTO: 6.9 K/UL (ref 4.8–10.8)

## 2018-03-18 PROCEDURE — 84703 CHORIONIC GONADOTROPIN ASSAY: CPT

## 2018-03-18 PROCEDURE — 93005 ELECTROCARDIOGRAM TRACING: CPT | Performed by: EMERGENCY MEDICINE

## 2018-03-18 PROCEDURE — 80053 COMPREHEN METABOLIC PANEL: CPT

## 2018-03-18 PROCEDURE — 85025 COMPLETE CBC W/AUTO DIFF WBC: CPT

## 2018-03-18 PROCEDURE — 700105 HCHG RX REV CODE 258: Performed by: EMERGENCY MEDICINE

## 2018-03-18 PROCEDURE — 36415 COLL VENOUS BLD VENIPUNCTURE: CPT

## 2018-03-18 PROCEDURE — 700102 HCHG RX REV CODE 250 W/ 637 OVERRIDE(OP): Performed by: EMERGENCY MEDICINE

## 2018-03-18 PROCEDURE — A9270 NON-COVERED ITEM OR SERVICE: HCPCS | Performed by: EMERGENCY MEDICINE

## 2018-03-18 RX ORDER — MECLIZINE HYDROCHLORIDE 25 MG/1
25 TABLET ORAL 3 TIMES DAILY PRN
Qty: 30 TAB | Refills: 0 | Status: SHIPPED | OUTPATIENT
Start: 2018-03-18

## 2018-03-18 RX ORDER — SODIUM CHLORIDE 9 MG/ML
1000 INJECTION, SOLUTION INTRAVENOUS ONCE
Status: COMPLETED | OUTPATIENT
Start: 2018-03-18 | End: 2018-03-18

## 2018-03-18 RX ORDER — MECLIZINE HYDROCHLORIDE 25 MG/1
25 TABLET ORAL ONCE
Status: COMPLETED | OUTPATIENT
Start: 2018-03-18 | End: 2018-03-18

## 2018-03-18 RX ADMIN — MECLIZINE HYDROCHLORIDE 25 MG: 25 TABLET ORAL at 01:08

## 2018-03-18 RX ADMIN — SODIUM CHLORIDE 1000 ML: 9 INJECTION, SOLUTION INTRAVENOUS at 01:09

## 2018-03-18 ASSESSMENT — LIFESTYLE VARIABLES: DO YOU DRINK ALCOHOL: NO

## 2018-03-18 NOTE — ED TRIAGE NOTES
Allison Weston  27 y.o.  Chief Complaint   Patient presents with   • Dizziness     starting approx 0030    • N/V       Pt self ambulatory to triage room, steady gait. NAD noted. Pt c/o dizziness starting early this AM. Pt states she has been staying hydrated. Pt has a 5 month old and is breast feeding. N/V was secondary to dizziness. Pt states she is seeing double intermittently.  Called to EKG for triage.     Triage process explained to patient, educated pt to notify staff at  if s/s worsened, apologized for wait time, and [facility]

## 2018-03-18 NOTE — ED NOTES
Pt discharged home. Discharge instructions provided, all questions answered, verbalizes understanding. All personal belongings with patient. Rx given x 1. VS WNL

## 2018-03-18 NOTE — ED PROVIDER NOTES
"ED Provider Note    CHIEF COMPLAINT  Chief Complaint   Patient presents with   • Dizziness     starting approx 0030    • N/V       HPI  Allison Weston is a 27 y.o. female who presents to emergency with dizziness.  with a 5-month-old. Stating that she's had approximately 2 days of dizziness. She has had some of the dizziness is while moving from lying to standing or sitting to standing with near syncopal type feeling. However additionally today she has noticed that the dizziness has become more room spinning especially with rightward gaze. No headache. No recent illness. Otherwise benign past medical history. No chest pain or palpitations. No leg pain or leg swelling. No travel.    REVIEW OF SYSTEMS  See HPI for further details. All other systems are negative.     PAST MEDICAL HISTORY   has a past medical history of Anemia during pregnancy needs to add iron plus taking prenatal vit (2011).    SOCIAL HISTORY  Social History     Social History Main Topics   • Smoking status: Never Smoker   • Smokeless tobacco: Never Used   • Alcohol use No   • Drug use: No   • Sexual activity: Yes     Partners: Male      Comment: no birth control       SURGICAL HISTORY   has a past surgical history that includes umbilical hernia repair (2015).    CURRENT MEDICATIONS  Home Medications     Reviewed by Humberto Clinton R.N. (Registered Nurse) on 18 at 0024  Med List Status: Unable to Obtain   Medication Last Dose Status   docusate sodium 100 MG Cap  Active   ferrous sulfate 325 (65 Fe) MG tablet  Active   ibuprofen (MOTRIN) 800 MG Tab  Active   Prenat w/o A-FE-DSS-Methfol-FA (PRENATAL MULTIVITAMIN) -400 MG-MCG-MCG tablet Not taking Active                ALLERGIES  No Known Allergies    PHYSICAL EXAM  VITAL SIGNS: /77   Pulse 73   Temp 37.2 °C (98.9 °F) (Temporal)   Resp 18   Ht 1.549 m (5' 1\")   Wt 79.3 kg (174 lb 13.2 oz)   LMP 2017 (Exact Date)   SpO2 96%   BMI 33.03 kg/m²  " @[facility][[id number]::@   Pulse ox interpretation: I interpret this pulse ox as normal.  Constitutional: Alert in no apparent distress.  HENT: No signs of trauma, Bilateral external ears normal, Nose normal.   Eyes: Pupils are equal and reactive, Conjunctiva normal, Non-icteric.   Neck: Normal range of motion, No tenderness, Supple, No stridor.   Lymphatic: No lymphadenopathy noted.   Cardiovascular: Regular rate and rhythm, no murmurs.   Thorax & Lungs: Normal breath sounds, No respiratory distress, No wheezing, No chest tenderness.   Abdomen: Bowel sounds normal, Soft, No tenderness, No masses, No pulsatile masses. No peritoneal signs.  Skin: Warm, Dry, No erythema, No rash.   Back: No bony tenderness, No CVA tenderness.   Extremities: Intact distal pulses, No edema, No tenderness, No cyanosis,  Negative Ashia's sign.   Musculoskeletal: Good range of motion in all major joints. No tenderness to palpation or major deformities noted.   Neurologic: Alert , Normal motor function, Normal sensory function, No focal deficits noted.   Psychiatric: Affect normal, Judgment normal, Mood normal.       DIAGNOSTIC STUDIES / PROCEDURES    EKG  2333: sinus 72, nl axis, intervals, no acute st t changes, rsr' V1V2.     LABS  Results for orders placed or performed during the hospital encounter of 03/17/18   CBC WITH DIFFERENTIAL   Result Value Ref Range    WBC 6.9 4.8 - 10.8 K/uL    RBC 4.31 4.20 - 5.40 M/uL    Hemoglobin 13.0 12.0 - 16.0 g/dL    Hematocrit 38.2 37.0 - 47.0 %    MCV 88.6 81.4 - 97.8 fL    MCH 30.2 27.0 - 33.0 pg    MCHC 34.0 33.6 - 35.0 g/dL    RDW 42.5 35.9 - 50.0 fL    Platelet Count 280 164 - 446 K/uL    MPV 10.2 9.0 - 12.9 fL    Neutrophils-Polys 55.80 44.00 - 72.00 %    Lymphocytes 34.30 22.00 - 41.00 %    Monocytes 4.60 0.00 - 13.40 %    Eosinophils 4.60 0.00 - 6.90 %    Basophils 0.40 0.00 - 1.80 %    Immature Granulocytes 0.30 0.00 - 0.90 %    Nucleated RBC 0.00 /100 WBC    Neutrophils (Absolute) 3.85 2.00 - 7.15  K/uL    Lymphs (Absolute) 2.37 1.00 - 4.80 K/uL    Monos (Absolute) 0.32 0.00 - 0.85 K/uL    Eos (Absolute) 0.32 0.00 - 0.51 K/uL    Baso (Absolute) 0.03 0.00 - 0.12 K/uL    Immature Granulocytes (abs) 0.02 0.00 - 0.11 K/uL    NRBC (Absolute) 0.00 K/uL   COMP METABOLIC PANEL   Result Value Ref Range    Sodium 138 135 - 145 mmol/L    Potassium 3.7 3.6 - 5.5 mmol/L    Chloride 105 96 - 112 mmol/L    Co2 24 20 - 33 mmol/L    Anion Gap 9.0 0.0 - 11.9    Glucose 136 (H) 65 - 99 mg/dL    Bun 9 8 - 22 mg/dL    Creatinine 0.44 (L) 0.50 - 1.40 mg/dL    Calcium 10.2 8.5 - 10.5 mg/dL    AST(SGOT) 114 (H) 12 - 45 U/L    ALT(SGPT) 257 (H) 2 - 50 U/L    Alkaline Phosphatase 79 30 - 99 U/L    Total Bilirubin 0.5 0.1 - 1.5 mg/dL    Albumin 4.4 3.2 - 4.9 g/dL    Total Protein 8.4 (H) 6.0 - 8.2 g/dL    Globulin 4.0 (H) 1.9 - 3.5 g/dL    A-G Ratio 1.1 g/dL   HCG Qual Serum   Result Value Ref Range    Beta-Hcg Qualitative Serum Negative Negative   ESTIMATED GFR   Result Value Ref Range    GFR If African American >60 >60 mL/min/1.73 m 2    GFR If Non African American >60 >60 mL/min/1.73 m 2   EKG (ER)   Result Value Ref Range    Report       [facility]    Test Date:  2018  Pt Name:    GENTRY HAN             Department: ER  MRN:        [id number]                      Room:  Gender:     Female                       Technician: [id number]  :        1990                   Requested By:ER TRIAGE PROTOCOL  Order #:    [id number]                    Reading MD:    Measurements  Intervals                                Axis  Rate:       72                           P:          57  DC:         164                          QRS:        45  QRSD:       100                          T:          23  QT:         396  QTc:        434    Interpretive Statements  SINUS RHYTHM  RSR' IN V1 OR V2, PROBABLY NORMAL VARIANT  No previous ECG available for comparison           RADIOLOGY  n/a    COURSE & MEDICAL  DECISION MAKING  Pertinent Labs & Imaging studies reviewed. (See chart for details)  Patient presented with dizziness. The patient does have 2 components one of which does sound more hypovolemic/orthostatic but also now more acutely vertiginous dizziness. She is feeling better after meclizine. She is understanding that she should avoid or proceed cautiously with breast-feeding well on meclizine. She will otherwise stay hydrated. She will return with any changes or worsening but will otherwise follow up with her outpatient referral as given.      The patient will not drink alcohol nor drive with prescribed medications. The patient will return for worsening symptoms and is stable at the time of discharge. The patient verbalizes understanding and will comply.    FINAL IMPRESSION  1. Vertigo            Electronically signed by: Mathew Price, 3/18/2018 1:55 AM

## 2018-03-18 NOTE — DISCHARGE INSTRUCTIONS
Epley Maneuver Self-Care  WHAT IS THE EPLEY MANEUVER?  The Epley maneuver is an exercise you can do to relieve symptoms of benign paroxysmal positional vertigo (BPPV). This condition is often just referred to as vertigo. BPPV is caused by the movement of tiny crystals (canaliths) inside your inner ear. The accumulation and movement of canaliths in your inner ear causes a sudden spinning sensation (vertigo) when you move your head to certain positions. Vertigo usually lasts about 30 seconds. BPPV usually occurs in just one ear. If you get vertigo when you lie on your left side, you probably have BPPV in your left ear. Your health care provider can tell you which ear is involved.   BPPV may be caused by a head injury. Many people older than 50 get BPPV for unknown reasons. If you have been diagnosed with BPPV, your health care provider may teach you how to do this maneuver. BPPV is not life threatening (benign) and usually goes away in time.   WHEN SHOULD I PERFORM THE EPLEY MANEUVER?  You can do this maneuver at home whenever you have symptoms of vertigo. You may do the Epley maneuver up to 3 times a day until your symptoms of vertigo go away.  HOW SHOULD I DO THE EPLEY MANEUVER?  1. Sit on the edge of a bed or table with your back straight. Your legs should be extended or hanging over the edge of the bed or table.    2. Turn your head residential toward the affected ear.    3. Lie backward quickly with your head turned until you are lying flat on your back. You may want to position a pillow under your shoulders.    4. Hold this position for 30 seconds. You may experience an attack of vertigo. This is normal. Hold this position until the vertigo stops.  5. Then turn your head to the opposite direction until your unaffected ear is facing the floor.    6. Hold this position for 30 seconds. You may experience an attack of vertigo. This is normal. Hold this position until the vertigo stops.  7. Now turn your whole body to  the same side as your head. Hold for another 30 seconds.    8. You can then sit back up.  ARE THERE RISKS TO THIS MANEUVER?  In some cases, you may have other symptoms (such as changes in your vision, weakness, or numbness). If you have these symptoms, stop doing the maneuver and call your health care provider. Even if doing these maneuvers relieves your vertigo, you may still have dizziness. Dizziness is the sensation of light-headedness but without the sensation of movement. Even though the Epley maneuver may relieve your vertigo, it is possible that your symptoms will return within 5 years.  WHAT SHOULD I DO AFTER THIS MANEUVER?  After doing the Epley maneuver, you can return to your normal activities. Ask your doctor if there is anything you should do at home to prevent vertigo. This may include:  · Sleeping with two or more pillows to keep your head elevated.  · Not sleeping on the side of your affected ear.  · Getting up slowly from bed.  · Avoiding sudden movements during the day.  · Avoiding extreme head movement, like looking up or bending over.  · Wearing a cervical collar to prevent sudden head movements.  WHAT SHOULD I DO IF MY SYMPTOMS GET WORSE?  Call your health care provider if your vertigo gets worse. Call your provider right way if you have other symptoms, including:   · Nausea.  · Vomiting.  · Headache.  · Weakness.  · Numbness.  · Vision changes.     This information is not intended to replace advice given to you by your health care provider. Make sure you discuss any questions you have with your health care provider.     Document Released: 12/23/2014 Document Reviewed: 12/23/2014  [employer] Patient Education ©2016 [employer]    Benign Positional Vertigo  Introduction  Vertigo is the feeling that you or your surroundings are moving when they are not. Benign positional vertigo is the most common form of vertigo. The cause of this condition is not serious (is benign). This condition  is triggered by certain movements and positions (is positional). This condition can be dangerous if it occurs while you are doing something that could endanger you or others, such as driving.  What are the causes?  In many cases, the cause of this condition is not known. It may be caused by a disturbance in an area of the inner ear that helps your brain to sense movement and balance. This disturbance can be caused by a viral infection (labyrinthitis), head injury, or repetitive motion.  What increases the risk?  This condition is more likely to develop in:  · Women.  · People who are 50 years of age or older.  What are the signs or symptoms?  Symptoms of this condition usually happen when you move your head or your eyes in different directions. Symptoms may start suddenly, and they usually last for less than a minute. Symptoms may include:  · Loss of balance and falling.  · Feeling like you are spinning or moving.  · Feeling like your surroundings are spinning or moving.  · Nausea and vomiting.  · Blurred vision.  · Dizziness.  · Involuntary eye movement (nystagmus).  Symptoms can be mild and cause only slight annoyance, or they can be severe and interfere with daily life. Episodes of benign positional vertigo may return (recur) over time, and they may be triggered by certain movements. Symptoms may improve over time.  How is this diagnosed?  This condition is usually diagnosed by medical history and a physical exam of the head, neck, and ears. You may be referred to a health care provider who specializes in ear, nose, and throat (ENT) problems (otolaryngologist) or a provider who specializes in disorders of the nervous system (neurologist). You may have additional testing, including:  · MRI.  · A CT scan.  · Eye movement tests. Your health care provider may ask you to change positions quickly while he or she watches you for symptoms of benign positional vertigo, such as nystagmus. Eye movement may be tested with an  electronystagmogram (ENG), caloric stimulation, the Jolo-Hallpike test, or the roll test.  · An electroencephalogram (EEG). This records electrical activity in your brain.  · Hearing tests.  How is this treated?  Usually, your health care provider will treat this by moving your head in specific positions to adjust your inner ear back to normal. Surgery may be needed in severe cases, but this is rare. In some cases, benign positional vertigo may resolve on its own in 2-4 weeks.  Follow these instructions at home:  Safety  · Move slowly.Avoid sudden body or head movements.  · Avoid driving.  · Avoid operating heavy machinery.  · Avoid doing any tasks that would be dangerous to you or others if a vertigo episode would occur.  · If you have trouble walking or keeping your balance, try using a cane for stability. If you feel dizzy or unstable, sit down right away.  · Return to your normal activities as told by your health care provider. Ask your health care provider what activities are safe for you.  General instructions  · Take over-the-counter and prescription medicines only as told by your health care provider.  · Avoid certain positions or movements as told by your health care provider.  · Drink enough fluid to keep your urine clear or pale yellow.  · Keep all follow-up visits as told by your health care provider. This is important.  Contact a health care provider if:  · You have a fever.  · Your condition gets worse or you develop new symptoms.  · Your family or friends notice any behavioral changes.  · Your nausea or vomiting gets worse.  · You have numbness or a “pins and needles” sensation.  Get help right away if:  · You have difficulty speaking or moving.  · You are always dizzy.  · You faint.  · You develop severe headaches.  · You have weakness in your legs or arms.  · You have changes in your hearing or vision.  · You develop a stiff neck.  · You develop sensitivity to light.  This information is not intended  to replace advice given to you by your health care provider. Make sure you discuss any questions you have with your health care provider.  Document Released: 09/25/2007 Document Revised: 05/25/2017 Document Reviewed: 04/11/2016  © 2017 Elsevier

## 2019-06-20 ENCOUNTER — HOSPITAL ENCOUNTER (EMERGENCY)
Facility: MEDICAL CENTER | Age: 29
End: 2019-06-20
Attending: EMERGENCY MEDICINE

## 2019-06-20 VITALS
HEART RATE: 66 BPM | DIASTOLIC BLOOD PRESSURE: 70 MMHG | OXYGEN SATURATION: 99 % | SYSTOLIC BLOOD PRESSURE: 122 MMHG | RESPIRATION RATE: 16 BRPM | WEIGHT: 191.8 LBS | BODY MASS INDEX: 36.24 KG/M2 | TEMPERATURE: 97 F

## 2019-06-20 DIAGNOSIS — R42 VERTIGO: ICD-10-CM

## 2019-06-20 PROCEDURE — 99283 EMERGENCY DEPT VISIT LOW MDM: CPT

## 2019-06-20 PROCEDURE — A9270 NON-COVERED ITEM OR SERVICE: HCPCS | Performed by: EMERGENCY MEDICINE

## 2019-06-20 PROCEDURE — 700102 HCHG RX REV CODE 250 W/ 637 OVERRIDE(OP): Performed by: EMERGENCY MEDICINE

## 2019-06-20 PROCEDURE — 700111 HCHG RX REV CODE 636 W/ 250 OVERRIDE (IP): Performed by: EMERGENCY MEDICINE

## 2019-06-20 RX ORDER — MECLIZINE HYDROCHLORIDE 25 MG/1
25 TABLET ORAL ONCE
Status: COMPLETED | OUTPATIENT
Start: 2019-06-20 | End: 2019-06-20

## 2019-06-20 RX ORDER — ONDANSETRON 4 MG/1
4 TABLET, ORALLY DISINTEGRATING ORAL EVERY 6 HOURS PRN
Qty: 10 TAB | Refills: 0 | Status: SHIPPED | OUTPATIENT
Start: 2019-06-20

## 2019-06-20 RX ORDER — ONDANSETRON 4 MG/1
4 TABLET, ORALLY DISINTEGRATING ORAL ONCE
Status: COMPLETED | OUTPATIENT
Start: 2019-06-20 | End: 2019-06-20

## 2019-06-20 RX ORDER — MECLIZINE HYDROCHLORIDE 25 MG/1
25 TABLET ORAL 3 TIMES DAILY PRN
Qty: 30 TAB | Refills: 0 | Status: SHIPPED | OUTPATIENT
Start: 2019-06-20

## 2019-06-20 RX ADMIN — MECLIZINE HYDROCHLORIDE 25 MG: 25 TABLET ORAL at 14:45

## 2019-06-20 RX ADMIN — ONDANSETRON 4 MG: 4 TABLET, ORALLY DISINTEGRATING ORAL at 14:45

## 2019-06-20 ASSESSMENT — LIFESTYLE VARIABLES: DO YOU DRINK ALCOHOL: NO

## 2019-06-20 NOTE — ED PROVIDER NOTES
ED Provider Note    Scribed for Balaji Cruz D.O. by Elyssa Cope. 6/20/2019  2:30 PM    Primary care provider: None noted  Means of arrival: Walk-in  History obtained from: Patient  History limited by: None    CHIEF COMPLAINT  Chief Complaint   Patient presents with   • Dizziness   • Blurred Vision       HPI  Allison Weston is a 28 y.o. female who presents to the Emergency Department with a chief complaint of intermittent dizziness onset 2 days ago. She notes that her eye pain is exacerbated upon sudden movements. Associated blurred vision and pain to the both eyes.  Negative recent infections, trauma, loss of sensation, spinning, vomiting, or headaches. The patient notes that she was diagnosed with vertigo in the past, however it resolved on it's own. The patient did not follow up with an ENT when was diagnosed with vertigo.       REVIEW OF SYSTEMS  Pertinent positives include dizziness, blurred vision and pain to both eyes.  Pertinent negatives include no  recent infections, trauma, loss of sensation, spinning, vomiting, or headaches..      PAST MEDICAL HISTORY  Past Medical History:   Diagnosis Date   • Anemia during pregnancy needs to add iron plus taking prenatal vit 5/12/2011       SURGICAL HISTORY  Past Surgical History:   Procedure Laterality Date   • UMBILICAL HERNIA REPAIR  1/6/2015        SOCIAL HISTORY  Social History   Substance Use Topics   • Smoking status: Never Smoker   • Smokeless tobacco: Never Used   • Alcohol use No      History   Drug Use No       FAMILY HISTORY  Family History   Problem Relation Age of Onset   • Diabetes Maternal Grandmother    • No Known Problems Mother    • Diabetes Father    • Diabetes Paternal Grandfather        CURRENT MEDICATIONS    Current Facility-Administered Medications:   •  ondansetron (ZOFRAN) tablet 4 mg, 4 mg, Oral, Once, Balaji Cruz D.O.  •  meclizine (ANTIVERT) tablet 25 mg, 25 mg, Oral, Once, Balaji Cruz D.O.    Current  Outpatient Prescriptions:   •  ondansetron (ZOFRAN ODT) 4 MG TABLET DISPERSIBLE, Take 1 Tab by mouth every 6 hours as needed for Nausea., Disp: 10 Tab, Rfl: 0  •  meclizine (ANTIVERT) 25 MG Tab, Take 1 Tab by mouth 3 times a day as needed for Dizziness or Vertigo., Disp: 30 Tab, Rfl: 0  •  meclizine (ANTIVERT) 25 MG Tab, Take 1 Tab by mouth 3 times a day as needed., Disp: 30 Tab, Rfl: 0  •  ibuprofen (MOTRIN) 800 MG Tab, Take 1 Tab by mouth every 8 hours as needed (For cramping after delivery; do not give if patient is receiving ketorolac (Toradol))., Disp: 30 Tab, Rfl: 1  •  ferrous sulfate 325 (65 Fe) MG tablet, Take 1 Tab by mouth 3 times a day., Disp: 90 Tab, Rfl: 2  •  docusate sodium 100 MG Cap, Take 100 mg by mouth 2 times a day as needed for Constipation., Disp: 60 Cap, Rfl: 1  •  Prenat w/o A-FE-DSS-Methfol-FA (PRENATAL MULTIVITAMIN) -400 MG-MCG-MCG tablet, Take 1 Tab by mouth every day., Disp: 30 Each, Rfl: 3    ALLERGIES  None noted    PHYSICAL EXAM  VITAL SIGNS: /77   Pulse 61   Temp 35.9 °C (96.7 °F) (Temporal)   Resp 14   Wt 87 kg (191 lb 12.8 oz)   SpO2 100%   BMI 36.24 kg/m²     Constitutional: Well developed, Well nourished, No acute distress, Non-toxic appearance.   Eyes: PERRLA, EOMI, Conjunctiva normal, No discharge.   Cardiovascular: Normal heart rate, Normal rhythm, No murmurs, No rubs, No gallops.   Thorax & Lungs: No respiratory distress, No rales, No rhonchi, No wheezing, No chest tenderness.   Abdomen: Bowel sounds normal, Soft, No tenderness, No guarding, No rebound, No masses, No pulsatile masses.   Skin: Warm, Dry, No erythema, No rash.   Musculoskeletal: Intact distal pulses, No edema, No cyanosis, No clubbing. Good range of motion in all major joints. No tenderness to palpation or major deformities noted, no CVA tenderness, no midline back tenderness.   Neurologic: Alert & oriented to month and age, Normal cognition, Cranial nerves II-XII are intact, No slurred  speech, Negative finger to nose bilaterally, No pronator drift bilaterally,   strength 5/5 bilaterally, Leg raise strength 5/5 bilaterally, Plantarflexion strength 5/5 bilaterally, Dorsiflexion strength 5/5 bilaterally, Deep tendon reflexes 2/4 upper and lower extremities bilaterally, Sensation intact throughout, [place] hallpike maneuver. Positive Nystagmus to the right eye.   Psychiatric: Affect normal for clinical presentation.  .     COURSE & MEDICAL DECISION MAKING  Pertinent Labs & Imaging studies reviewed. (See chart for details)    2:30 PM - Patient seen and examined at bedside. I explained to the patient that her condition is indicative of vertigo. Plan of care was discussed with patient which includes following up with a ENT. The patient will be discharged with Zofran 4 mg which is to be taken every 6 hours as needed and Antivert 25 mg which is to be taken three times daily as needed. The patient is encouraged to return to the ED if her symptoms persist of worsen. She verbalizes her understanding and agreement to her discharge instructions. Her vitals prior to discharge are: /70   Pulse 66   Temp 36.1 °C (97 °F) (Temporal)   Resp 16   Wt 87 kg (191 lb 12.8 oz)   SpO2 99%   BMI 36.24 kg/m²     This is a charming 28 y.o. female that presents with vertigo-like symptoms.  The patient has a positive Jonnie-Hallpike maneuver, reproducible vertigo with evidence of nystagmus.  She has notes of infection.  I do not believe she has a central lesion rather she has a benign positional vertigo presentation.  The patient received the above medications on reevaluation she is amatory without difficulty and feels much better.  She will be following up with the ENT for further evaluation and strict return precautions have been given.  I do not suspect posterior circulation abnormality such as dissection, hemorrhage, infection, central mass..    The patient will return for new or worsening symptoms and is stable at  the time of discharge.    The patient is referred to a primary physician for blood pressure management, diabetic screening, and for all other preventative health concerns.      DISPOSITION:  Patient will be discharged home in stable condition.    FOLLOW UP:  [facility], Emergency Dept  [address]  [place] [id number] [phone]    If symptoms worsen    Yumi Neely M.D.  [address]  [place] [address]  [phone]    Schedule an appointment as soon as possible for a visit in 1 week        OUTPATIENT MEDICATIONS:  Discharge Medication List as of 6/20/2019  3:05 PM      START taking these medications    Details   ondansetron (ZOFRAN ODT) 4 MG TABLET DISPERSIBLE Take 1 Tab by mouth every 6 hours as needed for Nausea., Disp-10 Tab, R-0, Print Rx Paper      !! meclizine (ANTIVERT) 25 MG Tab Take 1 Tab by mouth 3 times a day as needed for Dizziness or Vertigo., Disp-30 Tab, R-0, Print Rx Paper       !! - Potential duplicate medications found. Please discuss with provider.          FINAL IMPRESSION  1. Vertigo Active         Elyssa GAMBLE (Scribe), am scribing for, and in the presence of, Balaji Cruz D.O    Electronically signed by: Elyssa Cope (Scribe), 6/20/2019    Balaji GAMBLE D.O. personally performed the services described in this documentation, as scribed by Elyssa Cope in my presence, and it is both accurate and complete.    E    The note accurately reflects work and decisions made by me.  Balaji Cruz  6/20/2019  8:59 PM

## 2019-06-20 NOTE — ED TRIAGE NOTES
"Pt to triage, c/o dizziness, \" feeling like she is going cross-eyed\" , started two days ago, hx of vertigo, states \" this feels similar\"   "